# Patient Record
(demographics unavailable — no encounter records)

---

## 2024-10-10 NOTE — PLAN
[FreeTextEntry1] : 36-year-old female for well woman exam  1.  Pap done 2.  Self breast exam reviewed 3.  Annual exam in 1 year

## 2024-10-10 NOTE — HISTORY OF PRESENT ILLNESS
[FreeTextEntry1] : 36-year-old female presents for well woman exam.  Her menarche was at the age of 13.  Menses are every 28 days, lasting 3 days.  She denies heavy bleeding.  She does get mild cramping which is relieved with ibuprofen.  Last year, she was complaining of PMS symptoms and nausea with her menses.  She states both of these symptoms have improved without medication.  Her GYN history is significant for an ovarian cyst.  She is sexually active.  She is not using contraception.  She is not trying to get pregnant.  She has no significant past medical history.  Past surgical history includes an umbilical hernia repair and ACL repair.  Family history is noncontributory.  She socially drinks alcohol.  Denies tobacco or illicit drugs.  GYN history as above.  OB history: -0-0-2.  She has a history of 2 full-term vaginal deliveries.  No known drug allergies.  She is taking vitamins.

## 2024-10-10 NOTE — PHYSICAL EXAM
[Chaperone Present] : A chaperone was present in the examining room during all aspects of the physical examination [83224] : A chaperone was present during the pelvic exam. [FreeTextEntry2] : JAILENE Eastman [Appropriately responsive] : appropriately responsive [Alert] : alert [No Acute Distress] : no acute distress [No Lymphadenopathy] : no lymphadenopathy [Regular Rate Rhythm] : regular rate rhythm [No Murmurs] : no murmurs [Clear to Auscultation B/L] : clear to auscultation bilaterally [Soft] : soft [Non-tender] : non-tender [Non-distended] : non-distended [No HSM] : No HSM [No Lesions] : no lesions [No Mass] : no mass [Oriented x3] : oriented x3 [Examination Of The Breasts] : a normal appearance [No Masses] : no breast masses were palpable [Labia Majora] : normal [Labia Minora] : normal [Normal] : normal [Uterine Adnexae] : normal